# Patient Record
(demographics unavailable — no encounter records)

---

## 2025-06-19 NOTE — PLAN
[FreeTextEntry1] : 50-year-old female presents for evaluation Hypothyroidism-she is currently on L-thyroxine 88 mcg daily.  Lab work is drawn for full metabolic panel and thyroid analysis she will continue on the 88 mcg until the lab work reveals otherwise Morbid obesity-frustrated her overweight status Phentermine 37.5 mg daily Lab work is drawn for analysis Asthma-renewal of Singulair 10 mg daily is provided she uses on as-needed basis